# Patient Record
Sex: FEMALE | Race: WHITE | NOT HISPANIC OR LATINO
[De-identification: names, ages, dates, MRNs, and addresses within clinical notes are randomized per-mention and may not be internally consistent; named-entity substitution may affect disease eponyms.]

---

## 2017-09-20 ENCOUNTER — APPOINTMENT (OUTPATIENT)
Dept: OBGYN | Facility: CLINIC | Age: 72
End: 2017-09-20
Payer: MEDICARE

## 2017-09-20 VITALS
WEIGHT: 143 LBS | HEIGHT: 62 IN | DIASTOLIC BLOOD PRESSURE: 80 MMHG | BODY MASS INDEX: 26.31 KG/M2 | SYSTOLIC BLOOD PRESSURE: 157 MMHG | HEART RATE: 72 BPM

## 2017-09-20 DIAGNOSIS — R35.1 NOCTURIA: ICD-10-CM

## 2017-09-20 DIAGNOSIS — Z87.898 PERSONAL HISTORY OF OTHER SPECIFIED CONDITIONS: ICD-10-CM

## 2017-09-20 DIAGNOSIS — R35.0 FREQUENCY OF MICTURITION: ICD-10-CM

## 2017-09-20 DIAGNOSIS — I10 ESSENTIAL (PRIMARY) HYPERTENSION: ICD-10-CM

## 2017-09-20 PROCEDURE — G0101: CPT

## 2017-09-20 RX ORDER — AMLODIPINE BESYLATE AND BENAZEPRIL HYDROCHLORIDE 5; 10 MG/1; MG/1
5-10 CAPSULE ORAL
Refills: 0 | Status: ACTIVE | COMMUNITY

## 2019-09-23 ENCOUNTER — APPOINTMENT (OUTPATIENT)
Dept: OBGYN | Facility: CLINIC | Age: 74
End: 2019-09-23
Payer: MEDICARE

## 2019-09-23 VITALS
HEIGHT: 62 IN | BODY MASS INDEX: 24.48 KG/M2 | SYSTOLIC BLOOD PRESSURE: 130 MMHG | HEART RATE: 81 BPM | DIASTOLIC BLOOD PRESSURE: 72 MMHG | OXYGEN SATURATION: 98 % | WEIGHT: 133 LBS

## 2019-09-23 DIAGNOSIS — N39.41 URGE INCONTINENCE: ICD-10-CM

## 2019-09-23 DIAGNOSIS — Z01.419 ENCOUNTER FOR GYNECOLOGICAL EXAMINATION (GENERAL) (ROUTINE) W/OUT ABNORMAL FINDINGS: ICD-10-CM

## 2019-09-23 PROCEDURE — G0101: CPT

## 2019-09-23 RX ORDER — PRAVASTATIN SODIUM 80 MG/1
TABLET ORAL
Refills: 0 | Status: ACTIVE | COMMUNITY

## 2019-09-23 NOTE — HISTORY OF PRESENT ILLNESS
[Hot Flashes] : no hot flashes [Night Sweats] : no night sweats [Vaginal Itching] : no vaginal itching [Dyspareunia] : no dyspareunia [Mood Changes] : no mood changes [Headache] : no headache [Fatigue] : no fatigue [Weight Change] : no weight change [Irritability] : no irritability [Forgetfulness] : no forgetfulness [Loss of Libido] : loss of libido [Depression] : no depression [Anxiety] : no anxiety [Normal Amount/Duration] : was of a normal amount and duration [Spotting Between  Menses] : no spotting between menses [Regular Cycle Intervals] : periods have been regular [Menarche Age: ____] : age at menarche was [unfilled] [Menstrual Cramps] : menstrual cramps [Post-Menopause, No Sxs] : post-menopausal, currently without symptoms [Menopause Age: ____] : age at menopause was [unfilled] [Sexually Active] : is not sexually active

## 2019-09-23 NOTE — COUNSELING
[Breast Self Exam] : breast self exam [Vitamins/Supplements] : vitamins/supplements [Nutrition] : nutrition [Exercise] : exercise

## 2019-09-23 NOTE — PHYSICAL EXAM
[Awake] : awake [Alert] : alert [Acute Distress] : no acute distress [LAD] : no lymphadenopathy [Goiter] : goiter [Thyroid Nodule] : thyroid nodule [Mass] : no breast mass [Nipple Discharge] : no nipple discharge [Axillary LAD] : no axillary lymphadenopathy [Soft] : soft [Distended] : not distended [Tender] : non tender [Oriented x3] : oriented to person, place, and time [H/Smegaly] : no hepatosplenomegaly [Depressed Mood] : not depressed [Flat Affect] : affect not flat [No Lesions] : no genitalia lesions [Vulvitis] : no vulvitis [Vulvar Atrophy] : no vulvar atrophy [Labia Majora Erythema] : no erythema of the labia majora [Labia Minora Erythema] : no erythema of the labia minora [Labia Minora] : labia minora [Labia Majora] : labia major [Normal] : clitoris [Atrophy] : atrophy [Erythema] : no erythema [Cystocele] : no cystocele [Rectocele] : no rectocele [Discharge] : no discharge [Dry Mucosa] : dry mucosa [Tenderness] : no tenderness [Uterine Prolapse] : no uterine prolapse [No Bleeding] : there was no active vaginal bleeding [Adnexa Absent] : absent bilaterally [Absent] : absent [Adnexa Tenderness] : were not tender [Ovarian Mass (___ Cm)] : there were no adnexal masses [[email or web address]] : Last colonoscopy 2017

## 2019-09-23 NOTE — REVIEW OF SYSTEMS
[Frequency] : frequency [Fever] : no fever [Chills] : no chills [Feeling Tired] : not feeling tired [Recent Wt Gain ___ Lbs] : no recent weight gain [Pain] : no pain [Redness] : no redness [Sight Problems] : no sight problems [Discharge] : no discharge [Dec Hearing] : no hearing loss [Nosebleeds] : no nosebleeds [Nasal Discharge] : no nasal discharge [Sore Throat] : no sore throat [Heart Rate Is Fast] : the heart rate was not fast [Chest Pain] : no chest pain [Palpitations] : no palpitations [Dyspnea] : no shortness of breath [Lower Ext Edema] : no lower extremity edema [Cough] : no cough [Wheezing] : no wheezing [SOB on Exertion] : no shortness of breath during exertion [Abdominal Pain] : no abdominal pain [Vomiting] : no vomiting [Diarrhea] : no diarrhea [Constipation] : no constipation [Melena] : no melena [Heartburn] : no heartburn [Dysuria] : no dysuria [Incontinence] : no incontinence [Pelvic Pain] : no pelvic pain [Abn Vag Bleeding] : no abnormal vaginal bleeding [Urgency] : no urgency [Urethral Discharge] : no abnormal urethral discharge [Nocturia] : nocturia [Arthralgias] : no arthralgias [Joint Pain] : no joint pain [Joint Stiffness] : no joint stiffness [Joint Swelling] : no joint swelling [Skin Lesions] : no skin lesions [Breast Pain] : no breast pain [Change In A Mole] : no change in a mole [Breast Lump] : no breast lump [Convulsions] : no convulsions [Dizziness] : no dizziness [Fainting] : no fainting [Headache] : no headache [Sleep Disturbances] : no sleep disturbances [Anxiety] : no anxiety [Depression] : no depression [Hot Flashes] : no hot flashes [Deepening Voice] : no deepening of the voice [Muscle Weakness] : no muscle weakness [Easy Bruising] : does not bruise easily [Easy Bleeding] : does not bleed easily [Swollen Glands] : no swollen glands [Swollen Glands In The Neck] : no swollen glands in the neck [Feeling Weak] : no feelings of weakness

## 2019-12-02 DIAGNOSIS — Z00.00 ENCOUNTER FOR GENERAL ADULT MEDICAL EXAMINATION W/OUT ABNORMAL FINDINGS: ICD-10-CM
